# Patient Record
Sex: FEMALE | Race: WHITE | NOT HISPANIC OR LATINO
[De-identification: names, ages, dates, MRNs, and addresses within clinical notes are randomized per-mention and may not be internally consistent; named-entity substitution may affect disease eponyms.]

---

## 2021-07-21 ENCOUNTER — RX ONLY (OUTPATIENT)
Age: 46
Setting detail: RX ONLY
End: 2021-07-21

## 2021-07-21 ENCOUNTER — APPOINTMENT (RX ONLY)
Dept: URBAN - METROPOLITAN AREA CLINIC 28 | Facility: CLINIC | Age: 46
Setting detail: DERMATOLOGY
End: 2021-07-21

## 2021-07-21 DIAGNOSIS — L20.89 OTHER ATOPIC DERMATITIS: ICD-10-CM | Status: INADEQUATELY CONTROLLED

## 2021-07-21 PROCEDURE — ? MEDICATION COUNSELING

## 2021-07-21 PROCEDURE — ? COUNSELING

## 2021-07-21 PROCEDURE — ? PRESCRIPTION MEDICATION MANAGEMENT

## 2021-07-21 PROCEDURE — ? PRESCRIPTION

## 2021-07-21 PROCEDURE — ? TREATMENT REGIMEN

## 2021-07-21 PROCEDURE — 99204 OFFICE O/P NEW MOD 45 MIN: CPT

## 2021-07-21 RX ORDER — PIMECROLIMUS 10 MG/G
CREAM TOPICAL
Qty: 1 | Refills: 3 | Status: CANCELLED | COMMUNITY
Start: 2021-07-21

## 2021-07-21 RX ORDER — CLOBETASOL PROPIONATE 0.5 MG/G
OINTMENT TOPICAL BID
Qty: 1 | Refills: 2 | Status: ERX | COMMUNITY
Start: 2021-07-21

## 2021-07-21 RX ADMIN — PIMECROLIMUS: 10 CREAM TOPICAL at 00:00

## 2021-07-21 RX ADMIN — CLOBETASOL PROPIONATE: 0.5 OINTMENT TOPICAL at 00:00

## 2021-07-21 ASSESSMENT — LOCATION DETAILED DESCRIPTION DERM
LOCATION DETAILED: RIGHT DISTAL DORSAL FOREARM
LOCATION DETAILED: LEFT ULNAR DORSAL HAND
LOCATION DETAILED: RIGHT RADIAL DORSAL HAND
LOCATION DETAILED: LEFT CENTRAL OCCIPITAL SCALP
LOCATION DETAILED: LEFT DISTAL DORSAL FOREARM
LOCATION DETAILED: LEFT CENTRAL MALAR CHEEK

## 2021-07-21 ASSESSMENT — LOCATION SIMPLE DESCRIPTION DERM
LOCATION SIMPLE: SCALP
LOCATION SIMPLE: RIGHT FOREARM
LOCATION SIMPLE: LEFT FOREARM
LOCATION SIMPLE: LEFT HAND
LOCATION SIMPLE: RIGHT HAND
LOCATION SIMPLE: LEFT CHEEK

## 2021-07-21 ASSESSMENT — LOCATION ZONE DERM
LOCATION ZONE: ARM
LOCATION ZONE: FACE
LOCATION ZONE: SCALP
LOCATION ZONE: HAND

## 2021-07-21 NOTE — PROCEDURE: PRESCRIPTION MEDICATION MANAGEMENT
Render In Strict Bullet Format?: No
Discontinue Regimen: Hydrocortisone 2.5% lotion\\nBetamethasone 0.05% Ointment
Initiate Treatment: Elidel 1 % topical cream: Apply thin layer to face arms and hands BID\\nclobetasol 0.05 % ointment : Apply a thin layer BID to eczema of body
Detail Level: Zone

## 2021-07-21 NOTE — PROCEDURE: MEDICATION COUNSELING
none Topical Clindamycin Counseling: Patient counseled that this medication may cause skin irritation or allergic reactions.  In the event of skin irritation, the patient was advised to reduce the amount of the drug applied or use it less frequently.   The patient verbalized understanding of the proper use and possible adverse effects of clindamycin.  All of the patient's questions and concerns were addressed.

## 2021-07-21 NOTE — PROCEDURE: MEDICATION COUNSELING
Xelvernaz Pregnancy And Lactation Text: This medication is Pregnancy Category D and is not considered safe during pregnancy.  The risk during breast feeding is also uncertain.

## 2021-07-22 ENCOUNTER — RX ONLY (OUTPATIENT)
Age: 46
Setting detail: RX ONLY
End: 2021-07-22

## 2021-07-22 RX ORDER — PIMECROLIMUS 10 MG/G
CREAM TOPICAL
Qty: 1 | Refills: 3 | Status: ERX

## 2022-07-27 NOTE — PROCEDURE: MEDICATION COUNSELING
n/a Erythromycin Counseling:  I discussed with the patient the risks of erythromycin including but not limited to GI upset, allergic reaction, drug rash, diarrhea, increase in liver enzymes, and yeast infections.

## 2023-02-02 ENCOUNTER — APPOINTMENT (RX ONLY)
Dept: URBAN - METROPOLITAN AREA CLINIC 28 | Facility: CLINIC | Age: 48
Setting detail: DERMATOLOGY
End: 2023-02-02

## 2023-02-02 DIAGNOSIS — L64.8 OTHER ANDROGENIC ALOPECIA: ICD-10-CM | Status: INADEQUATELY CONTROLLED

## 2023-02-02 DIAGNOSIS — L20.89 OTHER ATOPIC DERMATITIS: ICD-10-CM | Status: IMPROVED

## 2023-02-02 PROBLEM — L65.9 NONSCARRING HAIR LOSS, UNSPECIFIED: Status: ACTIVE | Noted: 2023-02-02

## 2023-02-02 PROCEDURE — ? PRESCRIPTION

## 2023-02-02 PROCEDURE — ? COUNSELING

## 2023-02-02 PROCEDURE — ? ORDER TESTS

## 2023-02-02 PROCEDURE — ? PRESCRIPTION MEDICATION MANAGEMENT

## 2023-02-02 PROCEDURE — ? PHOTO-DOCUMENTATION

## 2023-02-02 PROCEDURE — 99214 OFFICE O/P EST MOD 30 MIN: CPT

## 2023-02-02 RX ORDER — HYDROCORTISONE 25 MG/G
CREAM TOPICAL BID
Qty: 30 | Refills: 3 | Status: ERX | COMMUNITY
Start: 2023-02-02

## 2023-02-02 RX ORDER — CLOBETASOL PROPIONATE 0.5 MG/G
OINTMENT TOPICAL BID
Qty: 60 | Refills: 2 | Status: ERX

## 2023-02-02 RX ADMIN — HYDROCORTISONE: 25 CREAM TOPICAL at 00:00

## 2023-02-02 ASSESSMENT — LOCATION DETAILED DESCRIPTION DERM
LOCATION DETAILED: RIGHT RADIAL DORSAL HAND
LOCATION DETAILED: LEFT SUPERIOR PARIETAL SCALP
LOCATION DETAILED: LEFT CENTRAL MALAR CHEEK
LOCATION DETAILED: LEFT DISTAL POSTERIOR UPPER ARM
LOCATION DETAILED: RIGHT PROXIMAL POSTERIOR UPPER ARM
LOCATION DETAILED: LEFT RADIAL DORSAL HAND

## 2023-02-02 ASSESSMENT — LOCATION ZONE DERM
LOCATION ZONE: FACE
LOCATION ZONE: HAND
LOCATION ZONE: SCALP
LOCATION ZONE: ARM

## 2023-02-02 ASSESSMENT — LOCATION SIMPLE DESCRIPTION DERM
LOCATION SIMPLE: LEFT HAND
LOCATION SIMPLE: LEFT CHEEK
LOCATION SIMPLE: RIGHT HAND
LOCATION SIMPLE: RIGHT POSTERIOR UPPER ARM
LOCATION SIMPLE: LEFT POSTERIOR UPPER ARM
LOCATION SIMPLE: SCALP

## 2023-02-02 NOTE — PROCEDURE: PRESCRIPTION MEDICATION MANAGEMENT
Plan: Recommended OTC Topical (patient explained she is non compliant with topicals, very interested in oral rx)\\n\\nTo see what lab work shows and send oral rx after we receive results.
Detail Level: Zone
Render In Strict Bullet Format?: No
Continue Regimen: clobetasol 0.05 % topical ointment: Apply a thin layer to AA of the body BID X 2 weeks until clear and then use as needed for flare ups (NEVER ON THE FACE)\\nhydrocortisone 2.5 % topical cream: Apply a thin layer to AA of body BID until clear and then use as needed

## 2023-02-02 NOTE — HPI: HAIR LOSS
Previous Labs: No
How Did The Hair Loss Occur?: gradual in onset
How Severe Is Your Hair Loss?: moderate
What Hair Products Do You Use?: Selsun blue

## 2023-02-02 NOTE — PROCEDURE: ORDER TESTS
Performing Laboratory: [id number]
Billing Type: Third-Party Bill
Expected Date Of Service: 02/02/2023
Lab Facility: 0
Bill For Surgical Tray: no

## 2023-02-21 ENCOUNTER — RX ONLY (OUTPATIENT)
Age: 48
Setting detail: RX ONLY
End: 2023-02-21

## 2023-02-21 RX ORDER — MELATONIN 3 MG
TABLET ORAL BID
Qty: 60 | Refills: 6 | Status: ERX | COMMUNITY
Start: 2023-02-21

## 2023-07-06 ENCOUNTER — RX ONLY (OUTPATIENT)
Age: 48
Setting detail: RX ONLY
End: 2023-07-06

## 2023-07-06 ENCOUNTER — APPOINTMENT (RX ONLY)
Dept: URBAN - METROPOLITAN AREA CLINIC 28 | Facility: CLINIC | Age: 48
Setting detail: DERMATOLOGY
End: 2023-07-06

## 2023-07-06 DIAGNOSIS — L64.8 OTHER ANDROGENIC ALOPECIA: ICD-10-CM | Status: INADEQUATELY CONTROLLED

## 2023-07-06 DIAGNOSIS — L20.89 OTHER ATOPIC DERMATITIS: ICD-10-CM | Status: IMPROVED

## 2023-07-06 PROBLEM — L65.9 NONSCARRING HAIR LOSS, UNSPECIFIED: Status: ACTIVE | Noted: 2023-07-06

## 2023-07-06 PROCEDURE — ? PRESCRIPTION

## 2023-07-06 PROCEDURE — ? ORDER TESTS

## 2023-07-06 PROCEDURE — ? COUNSELING

## 2023-07-06 PROCEDURE — ? PHOTO-DOCUMENTATION

## 2023-07-06 PROCEDURE — 99214 OFFICE O/P EST MOD 30 MIN: CPT

## 2023-07-06 PROCEDURE — ? PRESCRIPTION MEDICATION MANAGEMENT

## 2023-07-06 RX ORDER — HYDROCORTISONE 25 MG/G
CREAM TOPICAL BID
Qty: 30 | Refills: 3 | Status: ERX

## 2023-07-06 RX ORDER — MINOXIDIL 2.5 MG/1
TABLET ORAL QDAY
Qty: 30 | Refills: 6 | Status: ERX | COMMUNITY
Start: 2023-07-06

## 2023-07-06 RX ORDER — CLOBETASOL PROPIONATE 0.5 MG/G
OINTMENT TOPICAL BID
Qty: 60 | Refills: 2 | Status: ERX

## 2023-07-06 RX ADMIN — MINOXIDIL: 2.5 TABLET ORAL at 00:00

## 2023-07-06 ASSESSMENT — LOCATION ZONE DERM
LOCATION ZONE: FACE
LOCATION ZONE: SCALP
LOCATION ZONE: HAND
LOCATION ZONE: ARM

## 2023-07-06 ASSESSMENT — LOCATION DETAILED DESCRIPTION DERM
LOCATION DETAILED: LEFT SUPERIOR PARIETAL SCALP
LOCATION DETAILED: RIGHT RADIAL DORSAL HAND
LOCATION DETAILED: LEFT DISTAL POSTERIOR UPPER ARM
LOCATION DETAILED: LEFT CENTRAL MALAR CHEEK
LOCATION DETAILED: RIGHT PROXIMAL POSTERIOR UPPER ARM
LOCATION DETAILED: LEFT RADIAL DORSAL HAND

## 2023-07-06 ASSESSMENT — LOCATION SIMPLE DESCRIPTION DERM
LOCATION SIMPLE: LEFT POSTERIOR UPPER ARM
LOCATION SIMPLE: RIGHT HAND
LOCATION SIMPLE: LEFT CHEEK
LOCATION SIMPLE: SCALP
LOCATION SIMPLE: LEFT HAND
LOCATION SIMPLE: RIGHT POSTERIOR UPPER ARM

## 2023-07-06 NOTE — PROCEDURE: PRESCRIPTION MEDICATION MANAGEMENT
Plan: Recommended OTC Topical minoxidil
Continue Regimen: Calcium vitamin D QDAY
Initiate Treatment: minoxidil 2.5 mg tablet: Take 1/4 of a pill  PO QDAY then will increase as instructed.
Detail Level: Zone
Render In Strict Bullet Format?: No
Continue Regimen: clobetasol 0.05 % topical ointment: Apply a thin layer to AA of the body BID X 2 weeks until clear and then use as needed for flare ups (NEVER ON THE FACE)\\nhydrocortisone 2.5 % topical cream: Apply a thin layer to AA of body BID until clear and then use as needed

## 2023-07-06 NOTE — PROCEDURE: ORDER TESTS
Billing Type: Third-Party Bill
Expected Date Of Service: 07/06/2023
Lab Facility: 0
Performing Laboratory: [id number]
Bill For Surgical Tray: no

## 2023-10-12 NOTE — PROCEDURE: MEDICATION COUNSELING
Pt changed and respostioned, ostomy bag changed   Acitretin Counseling:  I discussed with the patient the risks of acitretin including but not limited to hair loss, dry lips/skin/eyes, liver damage, hyperlipidemia, depression/suicidal ideation, photosensitivity.  Serious rare side effects can include but are not limited to pancreatitis, pseudotumor cerebri, bony changes, clot formation/stroke/heart attack.  Patient understands that alcohol is contraindicated since it can result in liver toxicity and significantly prolong the elimination of the drug by many years.

## 2024-05-28 ENCOUNTER — RX ONLY (OUTPATIENT)
Age: 49
Setting detail: RX ONLY
End: 2024-05-28

## 2024-05-28 ENCOUNTER — APPOINTMENT (RX ONLY)
Dept: URBAN - METROPOLITAN AREA CLINIC 28 | Facility: CLINIC | Age: 49
Setting detail: DERMATOLOGY
End: 2024-05-28

## 2024-05-28 DIAGNOSIS — L71.8 OTHER ROSACEA: ICD-10-CM

## 2024-05-28 DIAGNOSIS — L65.0 TELOGEN EFFLUVIUM: ICD-10-CM

## 2024-05-28 PROCEDURE — ? MDM - TREATMENT GOALS

## 2024-05-28 PROCEDURE — 99214 OFFICE O/P EST MOD 30 MIN: CPT

## 2024-05-28 PROCEDURE — ? COUNSELING

## 2024-05-28 PROCEDURE — ? PRESCRIPTION

## 2024-05-28 PROCEDURE — ? PRESCRIPTION MEDICATION MANAGEMENT

## 2024-05-28 PROCEDURE — ? DIAGNOSIS COMMENT

## 2024-05-28 RX ORDER — CLOBETASOL PROPIONATE 0.5 MG/G
1 OINTMENT TOPICAL BID
Qty: 60 | Refills: 1 | Status: ERX

## 2024-05-28 RX ORDER — MINOXIDIL 2.5 MG/1
TABLET ORAL QDAY
Qty: 30 | Refills: 6 | Status: ERX

## 2024-05-28 RX ORDER — METRONIDAZOLE 10 MG/G
1 GEL TOPICAL QDAY
Qty: 60 | Refills: 3 | Status: ERX | COMMUNITY
Start: 2024-05-28

## 2024-05-28 RX ADMIN — METRONIDAZOLE 1: 10 GEL TOPICAL at 00:00

## 2024-05-28 ASSESSMENT — LOCATION DETAILED DESCRIPTION DERM
LOCATION DETAILED: LEFT INFERIOR CENTRAL MALAR CHEEK
LOCATION DETAILED: LEFT SUPERIOR PARIETAL SCALP

## 2024-05-28 ASSESSMENT — LOCATION ZONE DERM
LOCATION ZONE: SCALP
LOCATION ZONE: FACE

## 2024-05-28 ASSESSMENT — LOCATION SIMPLE DESCRIPTION DERM
LOCATION SIMPLE: LEFT CHEEK
LOCATION SIMPLE: SCALP

## 2024-05-28 NOTE — PROCEDURE: PRESCRIPTION MEDICATION MANAGEMENT
Render In Strict Bullet Format?: No
Initiate Treatment: Metrogel 1% topical: Apply thin layer to face qday
Detail Level: Zone
Plan: Recommended OTC Topical minoxidil
Continue Regimen: minoxidil 2.5mg tablet: Take 1/4 of a pill po qday

## 2025-06-17 NOTE — PROCEDURE: PHOTO-DOCUMENTATION
Carlos ACUÑA MA received a call from the patient to schedule  a virtual appointment to discuss his MRI results with Dr. Matthew Craft M.D.. The patient confirmed appointment date with MD tomorrow.        
Detail Level: Zone
Photo Preface (Leave Blank If You Do Not Want): Photographs were obtained today